# Patient Record
Sex: MALE | Race: WHITE
[De-identification: names, ages, dates, MRNs, and addresses within clinical notes are randomized per-mention and may not be internally consistent; named-entity substitution may affect disease eponyms.]

---

## 2020-04-02 ENCOUNTER — HOSPITAL ENCOUNTER (OUTPATIENT)
Dept: HOSPITAL 76 - EMS | Age: 25
Discharge: ANOTHER HOSPITAL | End: 2020-04-02
Attending: SURGERY
Payer: COMMERCIAL

## 2020-04-02 DIAGNOSIS — R00.1: Primary | ICD-10-CM

## 2020-04-07 ENCOUNTER — HOSPITAL ENCOUNTER (EMERGENCY)
Dept: HOSPITAL 76 - ED | Age: 25
Discharge: HOME | End: 2020-04-07
Payer: COMMERCIAL

## 2020-04-07 VITALS — SYSTOLIC BLOOD PRESSURE: 137 MMHG | DIASTOLIC BLOOD PRESSURE: 109 MMHG

## 2020-04-07 DIAGNOSIS — R07.89: Primary | ICD-10-CM

## 2020-04-07 LAB
ALBUMIN DIAFP-MCNC: 4.5 G/DL (ref 3.2–5.5)
ALBUMIN/GLOB SERPL: 1.1 {RATIO} (ref 1–2.2)
ALP SERPL-CCNC: 61 IU/L (ref 42–121)
ALT SERPL W P-5'-P-CCNC: 38 IU/L (ref 10–60)
ANION GAP SERPL CALCULATED.4IONS-SCNC: 7 MMOL/L (ref 6–13)
AST SERPL W P-5'-P-CCNC: 22 IU/L (ref 10–42)
BASOPHILS NFR BLD AUTO: 0 10^3/UL (ref 0–0.1)
BASOPHILS NFR BLD AUTO: 0.3 %
BILIRUB BLD-MCNC: 0.6 MG/DL (ref 0.2–1)
BUN SERPL-MCNC: 16 MG/DL (ref 6–20)
CALCIUM UR-MCNC: 10 MG/DL (ref 8.5–10.3)
CHLORIDE SERPL-SCNC: 102 MMOL/L (ref 101–111)
CO2 SERPL-SCNC: 27 MMOL/L (ref 21–32)
CREAT SERPLBLD-SCNC: 1 MG/DL (ref 0.6–1.2)
EOSINOPHIL # BLD AUTO: 0.1 10^3/UL (ref 0–0.7)
EOSINOPHIL NFR BLD AUTO: 0.8 %
ERYTHROCYTE [DISTWIDTH] IN BLOOD BY AUTOMATED COUNT: 12.4 % (ref 12–15)
GLOBULIN SER-MCNC: 4.1 G/DL (ref 2.1–4.2)
GLUCOSE SERPL-MCNC: 94 MG/DL (ref 70–100)
HGB UR QL STRIP: 14.2 G/DL (ref 14–18)
LIPASE SERPL-CCNC: 29 U/L (ref 22–51)
LYMPHOCYTES # SPEC AUTO: 2.2 10^3/UL (ref 1.5–3.5)
LYMPHOCYTES NFR BLD AUTO: 28.3 %
MCH RBC QN AUTO: 28.7 PG (ref 27–31)
MCHC RBC AUTO-ENTMCNC: 33.2 G/DL (ref 32–36)
MCV RBC AUTO: 86.5 FL (ref 80–94)
MONOCYTES # BLD AUTO: 0.5 10^3/UL (ref 0–1)
MONOCYTES NFR BLD AUTO: 6 %
NEUTROPHILS # BLD AUTO: 5 10^3/UL (ref 1.5–6.6)
NEUTROPHILS # SNV AUTO: 7.8 X10^3/UL (ref 4.8–10.8)
NEUTROPHILS NFR BLD AUTO: 64.1 %
PDW BLD AUTO: 8.7 FL (ref 7.4–11.4)
PLATELET # BLD: 310 10^3/UL (ref 130–450)
PROT SPEC-MCNC: 8.6 G/DL (ref 6.7–8.2)
RBC MAR: 4.95 10^6/UL (ref 4.7–6.1)
SODIUM SERPLBLD-SCNC: 136 MMOL/L (ref 135–145)

## 2020-04-07 PROCEDURE — 99284 EMERGENCY DEPT VISIT MOD MDM: CPT

## 2020-04-07 PROCEDURE — 93005 ELECTROCARDIOGRAM TRACING: CPT

## 2020-04-07 PROCEDURE — 83690 ASSAY OF LIPASE: CPT

## 2020-04-07 PROCEDURE — 84484 ASSAY OF TROPONIN QUANT: CPT

## 2020-04-07 PROCEDURE — 85025 COMPLETE CBC W/AUTO DIFF WBC: CPT

## 2020-04-07 PROCEDURE — 36415 COLL VENOUS BLD VENIPUNCTURE: CPT

## 2020-04-07 PROCEDURE — 71045 X-RAY EXAM CHEST 1 VIEW: CPT

## 2020-04-07 PROCEDURE — 80053 COMPREHEN METABOLIC PANEL: CPT

## 2020-04-07 NOTE — ED PHYSICIAN DOCUMENTATION
PD HPI CHEST PAIN





- Stated complaint


Stated Complaint: CHEST PX





- Chief complaint


Chief Complaint: Cardiac





- History obtained from


History obtained from: Patient (25 yo M who presents with left chest pain 

radiating to the arm for the last week[place] 

[place] there and apparently had an abnormal EKG and there was some concern for 

WPW so he was referred over to [facility]. He had a cardiac workup 

there which was negative and he was scheduled with a cardiologist May 7th. He 

has had persistent left chest pain since then but somewhat worse today so he 

presented. CP not associated w/ activity or rest, and he cannot identify any 

other alleviating or exacerbating factors. He has no associated fever, chills, 

cough or URI sx, dyspnea, abd pain, n/v/d, dysuria, dizziness or weakness. He 

denies any trauma to the area or heavy lifting. He was not put on any medication

after his recent visit at [facility]. He has not taken any otc medication for

the pain. Pt has no hx/o DM, tobacco abuse or drug abuse, heart disease. He has 

no fh of early heart disease. He is an active 25 yo M in the .)





Review of Systems


Constitutional: reports: Reviewed and negative


Eyes: reports: Reviewed and negative


Ears: reports: Reviewed and negative


Nose: reports: Reviewed and negative


Throat: reports: Reviewed and negative


Cardiac: reports: Chest pain / pressure.  denies: Palpitations, Pedal edema, 

Calf pain


Respiratory: reports: Reviewed and negative


GI: reports: Reviewed and negative


Skin: reports: Reviewed and negative


Musculoskeletal: reports: Reviewed and negative


Neurologic: reports: Reviewed and negative





PD PAST MEDICAL HISTORY





- Past Medical History


Past Medical History: No





- Allergies


Allergies/Adverse Reactions: 


                                    Allergies











Allergy/AdvReac Type Severity Reaction Status Date / Time


 


No Known Drug Allergies Allergy   Verified 04/07/20 14:09














- Social History


Does the pt smoke?: No


Smoking Status: Never smoker


Does the pt drink ETOH?: Yes


Does the pt have substance abuse?: No





PD ED PE NORMAL





- Vitals


Vital signs reviewed: Yes





- General


General: Alert and oriented X 3, No acute distress





- HEENT


HEENT: Atraumatic, PERRL, Moist mucous membranes, Pharynx benign





- Neck


Neck: Supple, no meningeal sign, No JVD





- Cardiac


Cardiac: RRR, No murmur, No gallop, No rub, Strong equal pulses, Other (left 

chest wall pain w/ palpation 2-4th ics sternal border to mid clavicular)





- Respiratory


Respiratory: No respiratory distress, Clear bilaterally





- Abdomen


Abdomen: Normal bowel sounds, Soft, Non tender, Non distended, No organomegaly





Results





- Vitals


Vitals: 





                               Vital Signs - 24 hr











  04/07/20 04/07/20 04/07/20





  14:09 14:39 16:00


 


Temperature 36.5 C  


 


Heart Rate 60 69 79


 


Respiratory 14 17 13





Rate   


 


Blood Pressure 150/100 H 141/100 H 137/109 H


 


O2 Saturation 98 98 98








                                     Oxygen











O2 Source                      Room air

















- Labs


Labs: 





                                Laboratory Tests











  04/07/20 04/07/20 04/07/20





  14:35 14:35 14:35


 


WBC  7.8  


 


RBC  4.95  


 


Hgb  14.2  


 


Hct  42.8  


 


MCV  86.5  


 


MCH  28.7  


 


MCHC  33.2  


 


RDW  12.4  


 


Plt Count  310  


 


MPV  8.7  


 


Neut # (Auto)  5.0  


 


Lymph # (Auto)  2.2  


 


Mono # (Auto)  0.5  


 


Eos # (Auto)  0.1  


 


Baso # (Auto)  0.0  


 


Absolute Nucleated RBC  0.00  


 


Nucleated RBC %  0.0  


 


Sodium   136 


 


Potassium   3.6 


 


Chloride   102 


 


Carbon Dioxide   27 


 


Anion Gap   7.0 


 


BUN   16 


 


Creatinine   1.0 


 


Estimated GFR (MDRD)   92 


 


Glucose   94 


 


Calcium   10.0 


 


Total Bilirubin   0.6 


 


AST   22 


 


ALT   38 


 


Alkaline Phosphatase   61 


 


Troponin I High Sens    < 2.3 L


 


Total Protein   8.6 H 


 


Albumin   4.5 


 


Globulin   4.1 


 


Albumin/Globulin Ratio   1.1 


 


Lipase   29 














PD MEDICAL DECISION MAKING





- ED course


Complexity details: reviewed results, re-evaluated patient, considered 

differential, d/w patient


ED course: 





25 yo M who has had persistent left chest pain for over a week. Was seen and 

cleared at [facility] and set up with cards for May 7th. His labs today are 

reassuring, his trop is negative. His initial EKG showed questionable st 

depression in V1-3, repeat is normal. Possible RVH. Pt has reproducible left 

chest wall pain. He declined pain meds but I encouraged him to try ibu or 

tylenol for relief. He has no dyspnea or hypoxia to suggest PE. He has no signi

ficant risk factors for early heart disease. I reviewed results w/ pt and will 

discharge home with follow up w/ cardiology as previously scheduled. He is to 

return if he has new or worsening chest pain, dyspnea, weakness, palpitations, 

nausea, or otherwise is feeling worse. I discussed mild htn w/ pt and to address

w/ cardiologist as outpt. 





Departure





- Departure


Disposition: 01 Home, Self Care


Clinical Impression: 


 Atypical chest pain, Chest wall pain





Condition: Good


Instructions:  ED Chest Pain NonCardiac, ED Chest Pain [facility] 


Comments: 


Please keep your follow up as scheduled with cardiology next month. If you have 

worsening pain or it is accompanied by shortness of breath or otherwise 

worsening symptoms, return to the ER. 


Discharge Date/Time: 04/07/20 16:44

## 2020-04-07 NOTE — XRAY REPORT
Reason:  Chest pain

Procedure Date:  04/07/2020   

Accession Number:  [id number] / [id number]                    

Procedure:  XR  - Chest 1 View X-Ray CPT Code:  58595

 

***Final Report***

 

 

FULL RESULT:

 

 

EXAM:

CHEST RADIOGRAPHY

 

EXAM DATE: 4/7/2020 02:41 PM.

 

CLINICAL HISTORY: Chest pain.

 

COMPARISON: None.

 

TECHNIQUE: 1 view.

 

FINDINGS:

Lungs/Pleura: No focal opacities evident. No pleural effusion. No 

pneumothorax.

 

Mediastinum: Within exam limitations, the cardiomediastinal contour is 

normal.

 

Other: None.

IMPRESSION: Normal single view chest.

 

RADIA

## 2022-07-24 ENCOUNTER — HOSPITAL ENCOUNTER (EMERGENCY)
Dept: HOSPITAL 76 - ED | Age: 27
Discharge: HOME | End: 2022-07-24
Payer: COMMERCIAL

## 2022-07-24 VITALS — SYSTOLIC BLOOD PRESSURE: 135 MMHG | DIASTOLIC BLOOD PRESSURE: 88 MMHG
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
## 2022-07-24 DIAGNOSIS — S90.222A: Primary | ICD-10-CM

## 2022-07-24 PROCEDURE — 99283 EMERGENCY DEPT VISIT LOW MDM: CPT

## 2022-07-24 PROCEDURE — 11740 EVACUATION SUBUNGUAL HMTMA: CPT

## 2022-07-24 NOTE — XRAY REPORT
PROCEDURE:  Foot 3 View LT

 

INDICATIONS:  Trauma

 

TECHNIQUE:  3 views of the foot were acquired.  

 

COMPARISON:  None

 

FINDINGS:  

 

Bones:  No fractures or dislocations can be seen of the third toe or elsewhere.  No suspicious bony l
esions.  

 

Soft tissues:  No tibiotalar joint effusion.  Achilles tendon appears normal.  

 

 

 

 

IMPRESSION:  

 

No significant plain film abnormality is seen.

 

Reviewed by: Larry Wood MD on 7/24/2022 10:58 AM FAITH

Approved by: Larry Wood MD on 7/24/2022 10:58 AM AKSRAVANI

 

 

Station ID:  [id number]

## 2022-07-24 NOTE — ED PHYSICIAN DOCUMENTATION
History of Present Illness





- Stated complaint


Stated Complaint: L FOOT INJ





- Chief complaint


Chief Complaint: Laceration





- History obtained from


History obtained from: Patient





- History of Present Illness


Timing: Today


Pain level max: 5


Pain level now: 4





- Additonal information


Additional information: 





Patient is a 27-year-old male who presents to the emergency department with a 

left third toe injury.  He states he dropped a piece of wood on the toe.  It 

caused bruising under the toenail.  Concerned about potential fracture.  Tetanus

up-to-date.  Worse with movement, better with rest.





Review of Systems


Constitutional: denies: Fever, Chills


GI: denies: Vomiting, Diarrhea


Skin: denies: Rash


Musculoskeletal: denies: Neck pain, Back pain


Neurologic: denies: Headache





PD PAST MEDICAL HISTORY





- Past Medical History


Past Medical History: No





- Past Surgical History


Past Surgical History: No





- Present Medications


Home Medications: 


                                Ambulatory Orders











 Medication  Instructions  Recorded  Confirmed


 


No Known Home Medications  07/24/22 07/24/22














- Allergies


Allergies/Adverse Reactions: 


                                    Allergies











Allergy/AdvReac Type Severity Reaction Status Date / Time


 


No Known Drug Allergies Allergy   Verified 04/07/20 14:09














- Social History


Does the pt smoke?: No


Smoking Status: Never smoker


Does the pt drink ETOH?: Yes


Does the pt have substance abuse?: No





- Immunizations


Immunizations are current?: Yes


Immunizations: TDAP current <10years





PD ED PE NORMAL





- Vitals


Vital signs reviewed: Yes





- General


General: Alert and oriented X 3, No acute distress





- Derm


Derm: Warm and dry





- Extremities


Extremities: Other (Small abrasion to the dorsal aspect of the left third toe.  

There is a small subungual hematoma as well.  No lacerations to repair.  

Diffusely tender to palpation.  Neurovascular intact.)





- Neuro


Neuro: Alert and oriented X 3





Results





- Vitals


Vitals: 


                               Vital Signs - 24 hr











  07/24/22





  11:44


 


Temperature 37.0 C


 


Heart Rate 81


 


Respiratory 19





Rate 


 


Blood Pressure 135/88 H


 


O2 Saturation 98








                                     Oxygen











O2 Source                      Room air

















- Rads (name of study)


  ** Left foot x-ray


Radiology: Final report received, EMP read contemporaneously, See rad report (No

acute abnormality)





PD MEDICAL DECISION MAKING





- ED course


Complexity details: considered differential, d/w patient


ED course: 





1% lidocaine was used to anesthetize the left third toe in a digital block.  

Subungual hematoma evacuated with electrocautery.  Tolerated well.  Wound was 

cleansed and bandaged.  Tetanus up-to-date.  Warnings of infection and 

instructions on wound care given at bedside. Patient counseled regarding signs 

and symptoms for which I believe and urgent re-evaluation would be necessary. 

Patient with good understanding of and agreement to plan and is comfortable 

going home at this time





This document was made in part using voice recognition software. While efforts 

are made to proofread this document, sound alike and grammatical errors may 

occur.





Departure





- Departure


Disposition: 01 Home, Self Care


Clinical Impression: 


 Abrasion





Subungual hematoma of third toe of left foot


Qualifiers:


 Encounter type: initial encounter Qualified Code(s): S90.222A - Contusion of 

left lesser toe(s) with damage to nail, initial encounter





Condition: Poor


Instructions:  ED Abrasion, ED Hematoma Subungual


Follow-Up: 


Provider,Other [Primary Care Provider] - 


Comments: 


Please soak the area in warm water 2-3 times daily.  This will allow the 

subungual hematoma to continue to drain.  Return if you worsen.  Keep the wound 

clean.  Return if you notice redness, swelling or drainage from the wound.  

There is no fracture on x-ray today.

## 2022-10-02 ENCOUNTER — HOSPITAL ENCOUNTER (EMERGENCY)
Dept: HOSPITAL 76 - ED | Age: 27
Discharge: HOME | End: 2022-10-02
Payer: COMMERCIAL

## 2022-10-02 VITALS — SYSTOLIC BLOOD PRESSURE: 152 MMHG | DIASTOLIC BLOOD PRESSURE: 100 MMHG

## 2022-10-02 DIAGNOSIS — H10.31: Primary | ICD-10-CM

## 2022-10-02 DIAGNOSIS — J06.9: ICD-10-CM

## 2022-10-02 PROCEDURE — 99282 EMERGENCY DEPT VISIT SF MDM: CPT

## 2022-10-02 NOTE — ED PHYSICIAN DOCUMENTATION
PD HPI OPHTHO





- Stated complaint


Stated Complaint: R EYE VISION CHANGE/DISCHARGE





- Chief complaint


Chief Complaint: Heent





- History obtained from


History obtained from: Patient





- History of Present Illness


Timing - onset: How many days ago (2)


Timing - duration: Days (2)


Timing - details: Abrupt onset, Still present (He had had some runny nose and 

sore throat for several days and now having right ear pressure and pain and 

right eye discharge and crusting.)


Location: Right


Associated symptoms: Redness, Matting, Other (right ear pressure and right 

periorbital pressure).  No: FB sensation, Photophobia


Contributing factors: Recent URI, Wears contacts, Other (wife and son have URI 

symptoms at home and are improving. Neither of them with conjunctival symptoms.)


Similar symptoms before: Has not had sx before


Recently seen: Not recently seen





Review of Systems


Constitutional: denies: Fever, Chills


Eyes: reports: Decreased vision (seemed blurry when awoke until got crusting 

cleared.).  denies: Loss of vision, Photophobia


Nose: reports: Rhinorrhea / runny nose, Congestion, Sinus pressure / pain (right

maxillary and periorbital pressure)


Respiratory: denies: Dyspnea, Cough


GI: denies: Nausea, Vomiting, Diarrhea





PD PAST MEDICAL HISTORY





- Past Medical History


Cardiovascular: None


Respiratory: None


Endocrine/Autoimmune: None





- Past Surgical History


Past Surgical History: No





- Present Medications


Home Medications: 


                                Ambulatory Orders











 Medication  Instructions  Recorded  Confirmed


 


Amoxicillin 500 mg PO TID #20 cap 10/02/22 


 


Neomycin Workman/Baci Zn/Poly/Hc 1 applic RIGHTEYE QID 4 Days #3.5 10/02/22 





[J Luis-Polycin Hc Eye Ointment] gm  














- Allergies


Allergies/Adverse Reactions: 


                                    Allergies











Allergy/AdvReac Type Severity Reaction Status Date / Time


 


No Known Drug Allergies Allergy   Verified 04/07/20 14:09














- Social History


Does the pt smoke?: No


Smoking Status: Never smoker


Does the pt drink ETOH?: Yes


Does the pt have substance abuse?: No





- Immunizations


Immunizations are current?: Yes


Immunizations: TDAP current <10years





PD ED PE NORMAL





- Vitals


Vital signs reviewed: Yes





- General


General: Alert and oriented X 3, No acute distress, Well developed/nourished





- HEENT


HEENT: PERRL, EOMI, Ears normal, Pharynx benign, Other (not light sensitive. 

Right eye hyperemic with conjunctival redness. No current discharge. )





- Neck


Neck: Supple, no meningeal sign, No adenopathy





- Cardiac


Cardiac: RRR, No murmur





- Respiratory


Respiratory: Clear bilaterally





- Derm


Derm: Normal color, Warm and dry, No rash





Results





- Vitals


Vitals: 


                               Vital Signs - 24 hr











  10/02/22





  10:48


 


Temperature 37.0 C


 


Heart Rate 85


 


Respiratory 17





Rate 


 


Blood Pressure 152/100 H


 


O2 Saturation 98








                                     Oxygen











O2 Source                      Room air

















PD MEDICAL DECISION MAKING





- ED course


Complexity details: considered differential (sounds likely sinus infection atop 

URI, showing itself into eye and right ear.), d/w patient





Departure





- Departure


Disposition: 01 Home, Self Care


Clinical Impression: 


Conjunctivitis


Qualifiers:


 Conjunctivitis type: acute Acute conjunctivitis type: unspecified Laterality: 

right Qualified Code(s): H10.31 - Unspecified acute conjunctivitis, right eye





Upper respiratory infection


Qualifiers:


 URI type: unspecified URI Qualified Code(s): J06.9 - Acute upper respiratory 

infection, unspecified





Condition: Stable


Record reviewed to determine appropriate education?: Yes


Follow-Up: 


[facility] [Provider Group]


Prescriptions: 


Amoxicillin 500 mg PO TID #20 cap


Neomycin Workman/Baci Zn/Poly/Hc [J Luis-Polycin Hc Eye Ointment] 1 applic RIGHTEYE QID 

4 Days #3.5 gm


Comments: 


Your symptoms do sound like some conjunctivitis.  This can be still viral at 

times related to your head cold.  However with the localized Seshan of the right

eye along with some eustachian tube/eardrum pressure and redness, consideration 

would be sinus infection as both of the sites lead to her draining to the sinus 

area.





We can treat this with Polysporin eye ointment 4 times daily as well as 

amoxicillin antibiotic orally 3 times daily.





Continue with any cough medicine or antihistamines if needed for congestion 

related to the viral component/head cold.





Stay well-hydrated.





I transmitted your prescriptions to [facility] in [place].





I would anticipate improvement over the next few days.


Discharge Date/Time: 10/02/22 11:27